# Patient Record
Sex: FEMALE | Race: WHITE | NOT HISPANIC OR LATINO | Employment: FULL TIME | URBAN - METROPOLITAN AREA
[De-identification: names, ages, dates, MRNs, and addresses within clinical notes are randomized per-mention and may not be internally consistent; named-entity substitution may affect disease eponyms.]

---

## 2021-01-22 ENCOUNTER — TELEPHONE (OUTPATIENT)
Dept: INTERNAL MEDICINE | Facility: CLINIC | Age: 31
End: 2021-01-22

## 2021-01-22 NOTE — TELEPHONE ENCOUNTER
Caller: Agueda Coles    Relationship to patient: Self    Best call back number: [phone]     Chief complaint: DIZZINESS    Type of visit:SAME DAY    Requested date:1/22/2021      Additional notes:    MS. COLES IS EXPERIENCING  DIZZINESS , SHE IS REQUESTING AN APPOINTMENT TODAY. SHE HAS SAME DAY SYMPTOMS, UNFORTUNATELY SHE WAS NOT ENTERED INTO Epic,(JUST ENTERED COMPLETE FULL REGISTRATION) STATES SHE IS AN ESTABLISHED PATIENT OF DR. JUDY SIMPSON. I CAN NOT SCHEDULE A SAME DAY APPOINTMENT WITH ANY PROVIDER IN YOUR OFFICE FOR THIS PATIENT.     IS THERE ANY SAME DAY APPOINTMENTS AVAILABLE IF SO CAN YOU CALL MS. COLES TO ADVISE HER.

## 2021-01-26 ENCOUNTER — OFFICE VISIT (OUTPATIENT)
Dept: INTERNAL MEDICINE | Facility: CLINIC | Age: 31
End: 2021-01-26

## 2021-01-26 VITALS — WEIGHT: 259 LBS | HEIGHT: 67 IN | BODY MASS INDEX: 40.65 KG/M2

## 2021-01-26 DIAGNOSIS — L71.9 ROSACEA: ICD-10-CM

## 2021-01-26 DIAGNOSIS — H81.10 BENIGN PAROXYSMAL POSITIONAL VERTIGO, UNSPECIFIED LATERALITY: Primary | Chronic | ICD-10-CM

## 2021-01-26 PROCEDURE — 99442 PR PHYS/QHP TELEPHONE EVALUATION 11-20 MIN: CPT | Performed by: INTERNAL MEDICINE

## 2021-01-26 RX ORDER — CITALOPRAM 40 MG/1
TABLET ORAL
COMMUNITY
Start: 2020-12-18 | End: 2021-06-11 | Stop reason: SDUPTHER

## 2021-01-26 RX ORDER — MELATONIN
1000 DAILY
COMMUNITY

## 2021-01-26 RX ORDER — METRONIDAZOLE 10 MG/G
GEL TOPICAL DAILY
Qty: 60 G | Refills: 2 | Status: SHIPPED | OUTPATIENT
Start: 2021-01-26 | End: 2021-06-11 | Stop reason: SDUPTHER

## 2021-01-26 NOTE — PROGRESS NOTES
"Chief Complaint  Dizziness (x1 month)     You have chosen to receive care through a telephone visit. Do you consent to use a telephone visit for your medical care today? Yes      Subjective          Agueda Coles presents to [facility] for dizziness. Ongoing x 1 mos. Pt describes as vertigo spells which onset after COVID infection 1-2 mos ago. Room spinning around her sensation and has caused her to fall during the time. Episodes will typically last < 1 min, but can have multiple within one day.   Also concerned about rosacea, gets more red with stress or temperature changes. Has visible vessels within that red area as well as papules.     Objective   Vital Signs:     Ht 170.2 cm (67\")   Wt 117 kg (259 lb)   BMI 40.57 kg/m²            Assessment and Plan {CC Problem List  Visit Diagnosis  ROS  Review (Popup)  Health Maintenance  Quality  BestPractice  Medications  SmartSets  SnapShot Encounters  Media :23}     Diagnoses and all orders for this visit:    1. Benign paroxysmal positional vertigo, unspecified laterality (Primary)  Assessment & Plan:  UNCONTROLLED  - will send to PT for eval and treatment with vestibular rehab, referral faxed, pt to call and schedule today      2. Rosacea  Assessment & Plan:  UNCONTROLLED  - start on topical therapy today  - will follow up at next appt    Orders:  -     metroNIDAZOLE (Metrogel) 1 % gel; Apply  topically to the appropriate area as directed Daily.  Dispense: 60 g; Refill: 2      I spent 11 minutes caring for Agueda on this date of service. This time includes time spent by me in the following activities:teleconference    Follow Up   Return for Next scheduled follow up.    Patient was given instructions and counseling regarding her condition or for health maintenance advice. Please see specific information pulled into the AVS if appropriate.     Marisabel Alaniz MD  [facility] [place] [facility] " [facility]  Phone: [phone]  Fax: [phone]

## 2021-01-26 NOTE — ASSESSMENT & PLAN NOTE
UNCONTROLLED  - will send to PT for eval and treatment with vestibular rehab, referral faxed, pt to call and schedule today

## 2021-06-11 ENCOUNTER — TELEMEDICINE (OUTPATIENT)
Dept: INTERNAL MEDICINE | Facility: CLINIC | Age: 31
End: 2021-06-11

## 2021-06-11 DIAGNOSIS — F32.A ANXIETY AND DEPRESSION: Primary | ICD-10-CM

## 2021-06-11 DIAGNOSIS — L71.9 ROSACEA: ICD-10-CM

## 2021-06-11 DIAGNOSIS — F41.9 ANXIETY AND DEPRESSION: Primary | ICD-10-CM

## 2021-06-11 DIAGNOSIS — R06.83 SNORING: ICD-10-CM

## 2021-06-11 PROCEDURE — 99214 OFFICE O/P EST MOD 30 MIN: CPT | Performed by: INTERNAL MEDICINE

## 2021-06-11 RX ORDER — METRONIDAZOLE 10 MG/G
GEL TOPICAL DAILY
Qty: 60 G | Refills: 3 | Status: SHIPPED | OUTPATIENT
Start: 2021-06-11

## 2021-06-11 RX ORDER — CITALOPRAM 40 MG/1
40 TABLET ORAL DAILY
Qty: 90 TABLET | Refills: 1 | Status: SHIPPED | OUTPATIENT
Start: 2021-06-11 | End: 2022-03-15

## 2021-06-11 NOTE — ASSESSMENT & PLAN NOTE
CONTROLLED  - cont on celexa at 40 mg--> refills sent  - Reviewed potential side effects of medication including sexual performance changes, insomnia, fatigue, weight changes. Pt tolerating well without any issues.

## 2021-06-11 NOTE — PROGRESS NOTES
Chief Complaint  Sleep concerns and med refills    You have chosen to receive care through a telehealth visit.  Do you consent to use a video/audio connection for your medical care today? Yes    Subjective          Agueda Coles presents to [facility] for follow up and med refills. Pt taking all medications daily as prescribed with good reported compliance. No issues or side effects with meds. Feels like celexa is working really well for her, controls her symptoms, anxiety gets worse if she misses her dose.   Also concerned about sleep and wants to talk about a sleep study. Has had more snoring, early morning fatigue, nighttime awakenings. Is a resident currently and notes she has gained weight recently which she knows contributes. Strong FHx of VENICE.     Objective     Physical Exam  Vitals and nursing note reviewed.   Constitutional:       General: She is not in acute distress.     Appearance: Normal appearance.   Pulmonary:      Effort: Pulmonary effort is normal. No respiratory distress.   Neurological:      Mental Status: She is alert and oriented to person, place, and time. Mental status is at baseline.   Psychiatric:         Mood and Affect: Mood normal.         Thought Content: Thought content normal.         Judgment: Judgment normal.          Result Review : : none     Assessment and Plan      Diagnoses and all orders for this visit:    1. Anxiety and depression (Primary)  Assessment & Plan:  CONTROLLED  - cont on celexa at 40 mg--> refills sent  - Reviewed potential side effects of medication including sexual performance changes, insomnia, fatigue, weight changes. Pt tolerating well without any issues.       Orders:  -     citalopram (CeleXA) 40 MG tablet; Take 1 tablet by mouth Daily.  Dispense: 90 tablet; Refill: 1    2. Rosacea  -     metroNIDAZOLE (Metrogel) 1 % gel; Apply  topically to the appropriate area as directed Daily.  Dispense: 60 g; Refill:  3    3. Snoring  -     Home Sleep Study; Future      Follow Up   Return in about 6 months (around 12/11/2021) for Annual physical.    Patient was given instructions and counseling regarding her condition or for health maintenance advice. Please see specific information pulled into the AVS if appropriate.     Marisabel Alaniz MD  [facility] [place] [place] [facility]  Phone: [phone]  Fax: [phone]

## 2021-07-15 ENCOUNTER — OFFICE VISIT (OUTPATIENT)
Dept: INTERNAL MEDICINE | Facility: CLINIC | Age: 31
End: 2021-07-15

## 2021-07-15 VITALS
HEART RATE: 97 BPM | TEMPERATURE: 96.6 F | DIASTOLIC BLOOD PRESSURE: 84 MMHG | HEIGHT: 67 IN | RESPIRATION RATE: 16 BRPM | OXYGEN SATURATION: 98 % | BODY MASS INDEX: 40.57 KG/M2 | SYSTOLIC BLOOD PRESSURE: 128 MMHG

## 2021-07-15 DIAGNOSIS — G47.33 MODERATE OBSTRUCTIVE SLEEP APNEA: Primary | ICD-10-CM

## 2021-07-15 PROCEDURE — 99213 OFFICE O/P EST LOW 20 MIN: CPT | Performed by: INTERNAL MEDICINE

## 2021-07-15 NOTE — PROGRESS NOTES
"Chief Complaint  Results and Sleep Apnea    Subjective          Agueda Cloes presents to [facility] for sleep study follow up. Pt had ordered at her last appt for reports of significant snoring at night with recent weight gain and strong FHx of VENICE.     Objective   Vital Signs:     /84   Pulse 97   Temp 96.6 °F (35.9 °C)   Resp 16   Ht 170.2 cm (67\")   SpO2 98%   BMI 40.57 kg/m²     Physical Exam  Vitals and nursing note reviewed.   Constitutional:       General: She is not in acute distress.     Appearance: Normal appearance.   Pulmonary:      Effort: Pulmonary effort is normal. No respiratory distress.   Neurological:      Mental Status: She is alert and oriented to person, place, and time. Mental status is at baseline.   Psychiatric:         Mood and Affect: Mood normal.         Thought Content: Thought content normal.         Judgment: Judgment normal.          Result Review :   The following data was reviewed by: Brianne Alaniz MD on 07/15/2021:    Data reviewed: home sleep study     Assessment and Plan      Diagnoses and all orders for this visit:    1. Moderate obstructive sleep apnea (Primary)     - home sleep study results reviewed   - pt is agreeable to PAP therapy, will send order to home DME company   - reviewed importance of compliance for ongoing insurance coverage and health benefits, pt voices understanding   - will follow up 30 days after initiation of therapy to review compliance report and titrate pressure as indicated    Follow Up   Return in about 6 weeks (around 8/26/2021) for follow up CPAP use.    Patient was given instructions and counseling regarding her condition or for health maintenance advice. Please see specific information pulled into the AVS if appropriate.     Marisabel Alaniz MD  [facility] [place] [facility]  Phone: [phone]  Fax: [phone]    "

## 2021-08-24 ENCOUNTER — TELEPHONE (OUTPATIENT)
Dept: INTERNAL MEDICINE | Facility: CLINIC | Age: 31
End: 2021-08-24

## 2021-08-24 NOTE — TELEPHONE ENCOUNTER
Hub to read:  Called patient to let her know that we received a notification from the cpap supplier that she will need to make a follow up appointment between 09/23 and 11/21 to continue therapy.

## 2021-11-29 ENCOUNTER — TELEPHONE (OUTPATIENT)
Dept: INTERNAL MEDICINE | Facility: CLINIC | Age: 31
End: 2021-11-29

## 2021-11-29 NOTE — TELEPHONE ENCOUNTER
PATIENT WAS WANTING TO RESCHEDULE HER APPOINTMENT. WHEN SHE WAS ADVISED OF THE NEXT AVAILABLE DATE SHE THEN ASKED TO BE CHANGED TO A VIRTUAL APPOINTMENT.    PLEASE ADVISE    PATIENT CAN BE REACHED AT  [phone]

## 2021-12-06 ENCOUNTER — OFFICE VISIT (OUTPATIENT)
Dept: INTERNAL MEDICINE | Facility: CLINIC | Age: 31
End: 2021-12-06

## 2021-12-06 VITALS
HEIGHT: 67 IN | HEART RATE: 93 BPM | OXYGEN SATURATION: 98 % | TEMPERATURE: 97.7 F | WEIGHT: 278 LBS | SYSTOLIC BLOOD PRESSURE: 148 MMHG | RESPIRATION RATE: 16 BRPM | BODY MASS INDEX: 43.63 KG/M2 | DIASTOLIC BLOOD PRESSURE: 90 MMHG

## 2021-12-06 DIAGNOSIS — G47.33 MODERATE OBSTRUCTIVE SLEEP APNEA: Primary | ICD-10-CM

## 2021-12-06 DIAGNOSIS — F41.9 ANXIETY AND DEPRESSION: Chronic | ICD-10-CM

## 2021-12-06 DIAGNOSIS — F32.A ANXIETY AND DEPRESSION: Chronic | ICD-10-CM

## 2021-12-06 DIAGNOSIS — U09.9 POST-COVID-19 CONDITION: ICD-10-CM

## 2021-12-06 PROCEDURE — 93000 ELECTROCARDIOGRAM COMPLETE: CPT | Performed by: INTERNAL MEDICINE

## 2021-12-06 PROCEDURE — 99214 OFFICE O/P EST MOD 30 MIN: CPT | Performed by: INTERNAL MEDICINE

## 2021-12-06 NOTE — PROGRESS NOTES
"Chief Complaint  Follow-up, Med Refill, Anxiety, and Sleep Apnea    Subjective          Agueda Coles presents to [facility] for follow up on sleep apnea and anxiety.   Started on CPAP therapy this summer related to new dx for VENICE. She has been compliant with her CPAP and notes a huge improvement in her sleep quality and breathing.   Continues on celexa for anxiety, no issues or complications. No side effects.   She does have some lingering SOB post-COVID. Infection was 6+ months ago. She is ok at rest, but even very minimal exertion with half flight of stairs leads to significant SOB. No PND, LE swelling.     Objective   Vital Signs:     /90   Pulse 93   Temp 97.7 °F (36.5 °C)   Resp 16   Ht 170.2 cm (67\")   Wt 126 kg (278 lb)   SpO2 98%   BMI 43.54 kg/m²       Physical Exam  Vitals and nursing note reviewed.   Constitutional:       General: She is not in acute distress.     Appearance: Normal appearance.   Cardiovascular:      Rate and Rhythm: Normal rate and regular rhythm.      Pulses: Normal pulses.      Heart sounds: Normal heart sounds. No murmur heard.      Pulmonary:      Effort: Pulmonary effort is normal. No respiratory distress.      Breath sounds: Normal breath sounds.   Abdominal:      General: Abdomen is flat. Bowel sounds are normal.      Palpations: Abdomen is soft.      Tenderness: There is no abdominal tenderness.   Musculoskeletal:      Right lower leg: No edema.      Left lower leg: No edema.   Neurological:      Mental Status: She is alert and oriented to person, place, and time. Mental status is at baseline.   Psychiatric:         Mood and Affect: Mood normal.         Behavior: Behavior normal.          ECG 12 Lead    Date/Time: 12/6/2021 4:42 PM  Performed by: Brianne Alaniz MD  Authorized by: Brianne Alaniz MD   Comparison: not compared with previous ECG   Previous ECG: no previous ECG available  Rhythm: sinus " rhythm  Rate: normal  BPM: 80  Conduction: conduction normal  T inversion: III, aVR and V1  QRS axis: normal  Other: no other findings    Clinical impression: normal ECG  Comments: Indication: dyspnea            Result Review :   The following data was reviewed by: Brianne Alaniz MD on 12/06/2021:    Data reviewed: Consultant notes sleep study 7/2021       ECG 12 Lead    Date/Time: 12/6/2021 4:42 PM  Performed by: Brianne Alaniz MD  Authorized by: Brianne Alaniz MD   Comparison: not compared with previous ECG   Previous ECG: no previous ECG available  Rhythm: sinus rhythm  Rate: normal  BPM: 80  Conduction: conduction normal  T inversion: III, aVR and V1  QRS axis: normal  Other: no other findings    Clinical impression: normal ECG  Comments: Indication: dyspnea              Assessment and Plan      Diagnoses and all orders for this visit:    1. Moderate obstructive sleep apnea (Primary)  Assessment & Plan:  CONTROLLED  - pt using nightly with good reported compliance  - tracks data using chaparrita and always now within goal range, symptomatically improved  - use >4 hours every night      2. Anxiety and depression  Assessment & Plan:  CONTROLLED  - cont on celexa at 40 mg--> refills sent  - Reviewed potential side effects of medication including sexual performance changes, insomnia, fatigue, weight changes. Pt tolerating well without any issues.         3. Post-COVID-19 condition  Assessment & Plan:  UNCONTROLLED  - EKG in office today normal  - will proceed with ECHO to rule out post-COVID cardiomyopathy  - if ECHO normal, will get PFT    Orders:  -     Adult Transthoracic Echo Complete W/ Cont if Necessary Per Protocol; Future  -     ECG 12 Lead      Follow Up   Return based on ECHO results.    Patient was given instructions and counseling regarding her condition or for health maintenance advice. Please see specific information pulled into the AVS if appropriate.     Marisabel Alaniz MD  [facility] [place] [facility] [place] [facility]  [place] [facility]  Phone: [phone]  Fax: [phone]

## 2021-12-06 NOTE — ASSESSMENT & PLAN NOTE
CONTROLLED  - pt using nightly with good reported compliance  - tracks data using chaparrita and always now within goal range, symptomatically improved  - use >4 hours every night

## 2021-12-06 NOTE — ASSESSMENT & PLAN NOTE
UNCONTROLLED  - EKG in office today normal  - will proceed with ECHO to rule out post-COVID cardiomyopathy  - if ECHO normal, will get PFT

## 2021-12-21 ENCOUNTER — HOSPITAL ENCOUNTER (OUTPATIENT)
Dept: CARDIOLOGY | Facility: HOSPITAL | Age: 31
Discharge: HOME | End: 2021-12-21
Admitting: INTERNAL MEDICINE

## 2021-12-21 VITALS
HEART RATE: 66 BPM | HEIGHT: 67 IN | DIASTOLIC BLOOD PRESSURE: 99 MMHG | WEIGHT: 278 LBS | SYSTOLIC BLOOD PRESSURE: 156 MMHG | BODY MASS INDEX: 43.63 KG/M2

## 2021-12-21 DIAGNOSIS — U09.9 POST-COVID-19 CONDITION: ICD-10-CM

## 2021-12-21 LAB
AORTIC ARCH: 2.6 CM
ASCENDING AORTA: 2.6 CM
BH CV ECHO MEAS - ACS: 2.3 CM
BH CV ECHO MEAS - AO MAX PG (FULL): -0.58 MMHG
BH CV ECHO MEAS - AO MAX PG: 6 MMHG
BH CV ECHO MEAS - AO MEAN PG (FULL): 0.52 MMHG
BH CV ECHO MEAS - AO MEAN PG: 3.6 MMHG
BH CV ECHO MEAS - AO ROOT AREA (BSA CORRECTED): 1.3
BH CV ECHO MEAS - AO ROOT AREA: 6.8 CM^2
BH CV ECHO MEAS - AO ROOT DIAM: 3 CM
BH CV ECHO MEAS - AO V2 MAX: 122.2 CM/SEC
BH CV ECHO MEAS - AO V2 MEAN: 89.7 CM/SEC
BH CV ECHO MEAS - AO V2 VTI: 25.6 CM
BH CV ECHO MEAS - ASC AORTA: 2.6 CM
BH CV ECHO MEAS - AVA(I,A): 2.4 CM^2
BH CV ECHO MEAS - AVA(I,D): 2.4 CM^2
BH CV ECHO MEAS - AVA(V,A): 2.5 CM^2
BH CV ECHO MEAS - AVA(V,D): 2.5 CM^2
BH CV ECHO MEAS - BSA(HAYCOCK): 2.5 M^2
BH CV ECHO MEAS - BSA: 2.3 M^2
BH CV ECHO MEAS - BZI_BMI: 43.5 KILOGRAMS/M^2
BH CV ECHO MEAS - BZI_METRIC_HEIGHT: 170.2 CM
BH CV ECHO MEAS - BZI_METRIC_WEIGHT: 126.1 KG
BH CV ECHO MEAS - EDV(CUBED): 109.9 ML
BH CV ECHO MEAS - EDV(MOD-SP2): 113 ML
BH CV ECHO MEAS - EDV(MOD-SP4): 97 ML
BH CV ECHO MEAS - EDV(TEICH): 107 ML
BH CV ECHO MEAS - EF(CUBED): 64.8 %
BH CV ECHO MEAS - EF(MOD-BP): 71 %
BH CV ECHO MEAS - EF(MOD-SP2): 77 %
BH CV ECHO MEAS - EF(MOD-SP4): 62.9 %
BH CV ECHO MEAS - EF(TEICH): 56.3 %
BH CV ECHO MEAS - ESV(CUBED): 38.6 ML
BH CV ECHO MEAS - ESV(MOD-SP2): 26 ML
BH CV ECHO MEAS - ESV(MOD-SP4): 36 ML
BH CV ECHO MEAS - ESV(TEICH): 46.8 ML
BH CV ECHO MEAS - FS: 29.4 %
BH CV ECHO MEAS - IVS/LVPW: 0.98
BH CV ECHO MEAS - IVSD: 1 CM
BH CV ECHO MEAS - LAT PEAK E' VEL: 15.4 CM/SEC
BH CV ECHO MEAS - LV DIASTOLIC VOL/BSA (35-75): 41.7 ML/M^2
BH CV ECHO MEAS - LV MASS(C)D: 184.2 GRAMS
BH CV ECHO MEAS - LV MASS(C)DI: 79.2 GRAMS/M^2
BH CV ECHO MEAS - LV MAX PG: 6.5 MMHG
BH CV ECHO MEAS - LV MEAN PG: 3.1 MMHG
BH CV ECHO MEAS - LV SYSTOLIC VOL/BSA (12-30): 15.5 ML/M^2
BH CV ECHO MEAS - LV V1 MAX: 127.9 CM/SEC
BH CV ECHO MEAS - LV V1 MEAN: 78.9 CM/SEC
BH CV ECHO MEAS - LV V1 VTI: 25.8 CM
BH CV ECHO MEAS - LVIDD: 4.8 CM
BH CV ECHO MEAS - LVIDS: 3.4 CM
BH CV ECHO MEAS - LVLD AP2: 8.8 CM
BH CV ECHO MEAS - LVLD AP4: 8.3 CM
BH CV ECHO MEAS - LVLS AP2: 6.3 CM
BH CV ECHO MEAS - LVLS AP4: 5.9 CM
BH CV ECHO MEAS - LVOT AREA (M): 2.3 CM^2
BH CV ECHO MEAS - LVOT AREA: 2.4 CM^2
BH CV ECHO MEAS - LVOT DIAM: 1.7 CM
BH CV ECHO MEAS - LVPWD: 1.1 CM
BH CV ECHO MEAS - MED PEAK E' VEL: 11.5 CM/SEC
BH CV ECHO MEAS - MR MAX PG: 54 MMHG
BH CV ECHO MEAS - MR MAX VEL: 367.5 CM/SEC
BH CV ECHO MEAS - MV A DUR: 0.13 SEC
BH CV ECHO MEAS - MV A MAX VEL: 90.7 CM/SEC
BH CV ECHO MEAS - MV DEC SLOPE: 478.9 CM/SEC^2
BH CV ECHO MEAS - MV DEC TIME: 0.18 SEC
BH CV ECHO MEAS - MV E MAX VEL: 114 CM/SEC
BH CV ECHO MEAS - MV E/A: 1.3
BH CV ECHO MEAS - MV MAX PG: 3.9 MMHG
BH CV ECHO MEAS - MV MEAN PG: 2.2 MMHG
BH CV ECHO MEAS - MV P1/2T MAX VEL: 105.9 CM/SEC
BH CV ECHO MEAS - MV P1/2T: 64.8 MSEC
BH CV ECHO MEAS - MV V2 MAX: 99.1 CM/SEC
BH CV ECHO MEAS - MV V2 MEAN: 71.8 CM/SEC
BH CV ECHO MEAS - MV V2 VTI: 31.9 CM
BH CV ECHO MEAS - MVA P1/2T LCG: 2.1 CM^2
BH CV ECHO MEAS - MVA(P1/2T): 3.4 CM^2
BH CV ECHO MEAS - MVA(VTI): 1.9 CM^2
BH CV ECHO MEAS - PA ACC TIME: 0.16 SEC
BH CV ECHO MEAS - PA MAX PG: 17.7 MMHG
BH CV ECHO MEAS - PA PR(ACCEL): 6.5 MMHG
BH CV ECHO MEAS - PA V2 MAX: 210.5 CM/SEC
BH CV ECHO MEAS - PULM A REVS DUR: 0.15 SEC
BH CV ECHO MEAS - PULM A REVS VEL: 31.1 CM/SEC
BH CV ECHO MEAS - PULM DIAS VEL: 38.3 CM/SEC
BH CV ECHO MEAS - PULM S/D: 1.8
BH CV ECHO MEAS - PULM SYS VEL: 69 CM/SEC
BH CV ECHO MEAS - RAP SYSTOLE: 3 MMHG
BH CV ECHO MEAS - RVOT AREA: 4.6 CM^2
BH CV ECHO MEAS - RVOT DIAM: 2.4 CM
BH CV ECHO MEAS - RVSP: 34 MMHG
BH CV ECHO MEAS - SI(AO): 75.3 ML/M^2
BH CV ECHO MEAS - SI(CUBED): 30.6 ML/M^2
BH CV ECHO MEAS - SI(LVOT): 26.5 ML/M^2
BH CV ECHO MEAS - SI(MOD-SP2): 37.4 ML/M^2
BH CV ECHO MEAS - SI(MOD-SP4): 26.2 ML/M^2
BH CV ECHO MEAS - SI(TEICH): 25.9 ML/M^2
BH CV ECHO MEAS - SV(AO): 175.2 ML
BH CV ECHO MEAS - SV(CUBED): 71.2 ML
BH CV ECHO MEAS - SV(LVOT): 61.6 ML
BH CV ECHO MEAS - SV(MOD-SP2): 87 ML
BH CV ECHO MEAS - SV(MOD-SP4): 61 ML
BH CV ECHO MEAS - SV(TEICH): 60.2 ML
BH CV ECHO MEAS - TAPSE (>1.6): 2.6 CM
BH CV ECHO MEAS - TR MAX VEL: 280.7 CM/SEC
BH CV ECHO MEASUREMENTS AVERAGE E/E' RATIO: 8.48
BH CV XLRA - RV BASE: 3.4 CM
BH CV XLRA - RV LENGTH: 8.2 CM
BH CV XLRA - RV MID: 2.8 CM
BH CV XLRA - TDI S': 13.3 CM/SEC
LEFT ATRIUM VOLUME INDEX: 20 ML/M2
MAXIMAL PREDICTED HEART RATE: 188 BPM
SINUS: 3 CM
STJ: 2.6 CM
STRESS TARGET HR: 160 BPM

## 2021-12-21 PROCEDURE — 93306 TTE W/DOPPLER COMPLETE: CPT | Performed by: INTERNAL MEDICINE

## 2021-12-21 PROCEDURE — 93306 TTE W/DOPPLER COMPLETE: CPT

## 2022-03-15 DIAGNOSIS — F41.9 ANXIETY AND DEPRESSION: ICD-10-CM

## 2022-03-15 DIAGNOSIS — F32.A ANXIETY AND DEPRESSION: ICD-10-CM

## 2022-03-15 RX ORDER — CITALOPRAM 40 MG/1
TABLET ORAL
Qty: 90 TABLET | Refills: 1 | Status: SHIPPED | OUTPATIENT
Start: 2022-03-15 | End: 2022-08-26 | Stop reason: SDUPTHER

## 2022-03-15 NOTE — TELEPHONE ENCOUNTER
Rx Refill Note  Requested Prescriptions     Pending Prescriptions Disp Refills   • citalopram (CeleXA) 40 MG tablet [Pharmacy Med Name: CITALOPRAM HBR 40 MG TABLET] 90 tablet 1     Sig: TAKE ONE TABLET BY MOUTH DAILY      Last office visit with prescribing clinician: 12/6/2021      Next office visit with prescribing clinician: Visit date not found            Fe Gamboa MA  03/15/22, 08:16 EDT

## 2022-08-26 DIAGNOSIS — F41.9 ANXIETY AND DEPRESSION: ICD-10-CM

## 2022-08-26 DIAGNOSIS — F32.A ANXIETY AND DEPRESSION: ICD-10-CM

## 2022-08-26 RX ORDER — CITALOPRAM 40 MG/1
40 TABLET ORAL DAILY
Qty: 30 TABLET | Refills: 0 | Status: SHIPPED | OUTPATIENT
Start: 2022-08-26 | End: 2022-09-22 | Stop reason: SDUPTHER

## 2022-09-22 ENCOUNTER — TELEMEDICINE (OUTPATIENT)
Dept: INTERNAL MEDICINE | Facility: CLINIC | Age: 32
End: 2022-09-22

## 2022-09-22 DIAGNOSIS — J30.2 SEASONAL ALLERGIES: ICD-10-CM

## 2022-09-22 DIAGNOSIS — F32.A ANXIETY AND DEPRESSION: Primary | Chronic | ICD-10-CM

## 2022-09-22 DIAGNOSIS — G47.33 MODERATE OBSTRUCTIVE SLEEP APNEA: ICD-10-CM

## 2022-09-22 DIAGNOSIS — F41.9 ANXIETY AND DEPRESSION: Primary | Chronic | ICD-10-CM

## 2022-09-22 PROCEDURE — 99214 OFFICE O/P EST MOD 30 MIN: CPT | Performed by: INTERNAL MEDICINE

## 2022-09-22 RX ORDER — CETIRIZINE HYDROCHLORIDE 10 MG/1
10 TABLET ORAL DAILY
COMMUNITY
End: 2022-09-22 | Stop reason: SDUPTHER

## 2022-09-22 RX ORDER — CETIRIZINE HYDROCHLORIDE 10 MG/1
10 TABLET ORAL DAILY
Qty: 90 TABLET | Refills: 3 | Status: SHIPPED | OUTPATIENT
Start: 2022-09-22

## 2022-09-22 RX ORDER — CITALOPRAM 40 MG/1
40 TABLET ORAL DAILY
Qty: 90 TABLET | Refills: 1 | Status: SHIPPED | OUTPATIENT
Start: 2022-09-22

## 2022-09-22 NOTE — PROGRESS NOTES
Chief Complaint  Anxiety follow up    You have chosen to receive care through a telehealth visit.  Do you consent to use a video/audio connection for your medical care today? Yes    Pt and physician were both in [place], [place] at the time of video visit.     Subjective          Agueda Coles presents to [facility] for anxiety follow up. Pt taking all medications daily as prescribed with good reported compliance. No issues or side effects with meds.   Feels like anxiety is well controlled on her current meds, no panic attacks, sleeping well.   Using CPAP nightly with 100% compliance and can tell a positive change in sleep quality. AHI scores in her chaparrita have all been in good range.       Objective     Physical Exam  Vitals and nursing note reviewed.   Constitutional:       General: She is not in acute distress.     Appearance: Normal appearance.   Pulmonary:      Effort: Pulmonary effort is normal. No respiratory distress.   Neurological:      Mental Status: She is alert and oriented to person, place, and time. Mental status is at baseline.   Psychiatric:         Mood and Affect: Mood normal.         Thought Content: Thought content normal.         Judgment: Judgment normal.          Result Review : : None     Assessment and Plan      Diagnoses and all orders for this visit:    1. Anxiety and depression (Primary)  Assessment & Plan:  CONTROLLED  - cont on current medications with celexa 40 mg daily--> refills sent  - Reviewed potential side effects of medication including sexual performance changes, insomnia, fatigue, weight changes. Pt tolerating well without any issues.       Orders:  -     citalopram (CeleXA) 40 MG tablet; Take 1 tablet by mouth Daily.  Dispense: 90 tablet; Refill: 1    2. Moderate obstructive sleep apnea  Assessment & Plan:  CONTROLLED  - pt using nightly with good reported compliance  - tracks data using chaparrita and always now within goal range,  symptomatically improved  - use >4 hours every night      3. Seasonal allergies  -     cetirizine (zyrTEC) 10 MG tablet; Take 1 tablet by mouth Daily.  Dispense: 90 tablet; Refill: 3      Follow Up   Return in about 6 months (around 3/22/2023) for Annual physical.    Patient was given instructions and counseling regarding her condition or for health maintenance advice. Please see specific information pulled into the AVS if appropriate.     Marisabel Alaniz MD  [facility] [place] [facility] [place]  Phone: [phone]  Fax: [phone]

## 2022-09-22 NOTE — ASSESSMENT & PLAN NOTE
CONTROLLED  - cont on current medications with celexa 40 mg daily--> refills sent  - Reviewed potential side effects of medication including sexual performance changes, insomnia, fatigue, weight changes. Pt tolerating well without any issues.

## 2023-02-21 ENCOUNTER — PATIENT MESSAGE (OUTPATIENT)
Dept: INTERNAL MEDICINE | Facility: CLINIC | Age: 33
End: 2023-02-21
Payer: COMMERCIAL

## 2023-02-21 DIAGNOSIS — G56.03 BILATERAL CARPAL TUNNEL SYNDROME: Primary | ICD-10-CM

## 2023-02-21 NOTE — TELEPHONE ENCOUNTER
From: Agueda Coles  To: Brianne Alaniz  Sent: 2/21/2023 4:06 PM EST  Subject: Carpal Tunnel Syndrome    Hi Dr. Alaniz!     It seems I have developed carpal tunnel syndrome- I have bilateral, R>L, palmar thumb/index finger numbness intermittently but worsening. I have tried nightly splinting with minimal improvement. Would you mind placing a referral for me to see Hand Surgery here at [facility]? My clinic is literally down [place] from them ;)     Thanks! Hope you are doing well!    Agueda Coles